# Patient Record
Sex: FEMALE | Race: BLACK OR AFRICAN AMERICAN | NOT HISPANIC OR LATINO | Employment: OTHER | URBAN - METROPOLITAN AREA
[De-identification: names, ages, dates, MRNs, and addresses within clinical notes are randomized per-mention and may not be internally consistent; named-entity substitution may affect disease eponyms.]

---

## 2018-10-02 NOTE — PATIENT DISCUSSION
Continue: ofloxacin (ofloxacin): drops: 0.3% 1 drop four times a day as directed into affected eye 09-

## 2018-10-02 NOTE — PATIENT DISCUSSION
Continue: prednisolone acetate (prednisolone acetate): drops,suspension: 1% 1 drop four times a day as directed into affected eye 09-

## 2022-05-17 ENCOUNTER — PREPPED CHART (OUTPATIENT)
Dept: URBAN - METROPOLITAN AREA CLINIC 7 | Facility: CLINIC | Age: 82
End: 2022-05-17

## 2022-07-07 RX ORDER — AMLODIPINE BESYLATE 2.5 MG/1
TABLET ORAL
COMMUNITY

## 2022-07-07 RX ORDER — ATORVASTATIN CALCIUM 10 MG/1
TABLET, FILM COATED ORAL
COMMUNITY

## 2022-07-07 RX ORDER — BISOPROLOL FUMARATE AND HYDROCHLOROTHIAZIDE 10; 6.25 MG/1; MG/1
TABLET ORAL
COMMUNITY
End: 2022-09-12

## 2022-07-07 RX ORDER — ALBUTEROL SULFATE 90 UG/1
AEROSOL, METERED RESPIRATORY (INHALATION)
COMMUNITY
End: 2022-10-31 | Stop reason: SDUPTHER

## 2022-07-07 RX ORDER — FLUTICASONE PROPIONATE 50 MCG
SPRAY, SUSPENSION (ML) NASAL
COMMUNITY

## 2022-11-01 ENCOUNTER — ESTABLISHED PATIENT (OUTPATIENT)
Facility: LOCATION | Age: 82
End: 2022-11-01

## 2022-11-01 DIAGNOSIS — H47.323: ICD-10-CM

## 2022-11-01 DIAGNOSIS — H25.89: ICD-10-CM

## 2022-11-01 DIAGNOSIS — E11.3293: ICD-10-CM

## 2022-11-01 DIAGNOSIS — H52.223: ICD-10-CM

## 2022-11-01 PROCEDURE — 92250 FUNDUS PHOTOGRAPHY W/I&R: CPT

## 2022-11-01 PROCEDURE — 92014 COMPRE OPH EXAM EST PT 1/>: CPT

## 2022-11-01 PROCEDURE — 92015 DETERMINE REFRACTIVE STATE: CPT

## 2022-11-01 ASSESSMENT — KERATOMETRY
OD_K2POWER_DIOPTERS: 44.75
OD_AXISANGLE2_DEGREES: 63
OS_AXISANGLE_DEGREES: 13
OS_K1POWER_DIOPTERS: 44.75
OS_AXISANGLE2_DEGREES: 103
OD_K1POWER_DIOPTERS: 44.00
OS_K2POWER_DIOPTERS: 45.25
OD_AXISANGLE_DEGREES: 153

## 2022-11-01 ASSESSMENT — VISUAL ACUITY
OS_SC: 20/25-2
OD_SC: 20/25
OU_SC: 20/25-2

## 2022-11-01 ASSESSMENT — TONOMETRY
OS_IOP_MMHG: 18
OD_IOP_MMHG: 18

## 2023-08-15 NOTE — PATIENT DISCUSSION
1 DAY POST-OP, OS- Ocuflox-1 drop 4 times a day until next visit. Acular-1 drop 4 times a day until next visit. Pred-forte-start using today, 1 drop 4 times a day until next visit. Patient was also given instruction sheet. Tetracycline Counseling: Patient counseled regarding possible photosensitivity and increased risk for sunburn.  Patient instructed to avoid sunlight, if possible.  When exposed to sunlight, patients should wear protective clothing, sunglasses, and sunscreen.  The patient was instructed to call the office immediately if the following severe adverse effects occur:  hearing changes, easy bruising/bleeding, severe headache, or vision changes.  The patient verbalized understanding of the proper use and possible adverse effects of tetracycline.  All of the patient's questions and concerns were addressed. Patient understands to avoid pregnancy while on therapy due to potential birth defects.

## 2023-11-06 ASSESSMENT — KERATOMETRY
OS_K2POWER_DIOPTERS: 45.25
OS_AXISANGLE_DEGREES: 13
OD_AXISANGLE2_DEGREES: 63
OD_K2POWER_DIOPTERS: 44.75
OD_AXISANGLE_DEGREES: 153
OD_K1POWER_DIOPTERS: 44.00
OS_K1POWER_DIOPTERS: 44.75
OS_AXISANGLE2_DEGREES: 103

## 2023-11-07 ENCOUNTER — ESTABLISHED PATIENT (OUTPATIENT)
Facility: LOCATION | Age: 83
End: 2023-11-07

## 2023-11-07 DIAGNOSIS — E11.3293: ICD-10-CM

## 2023-11-07 DIAGNOSIS — H52.223: ICD-10-CM

## 2023-11-07 DIAGNOSIS — H47.323: ICD-10-CM

## 2023-11-07 DIAGNOSIS — H25.89: ICD-10-CM

## 2023-11-07 PROCEDURE — 92014 COMPRE OPH EXAM EST PT 1/>: CPT

## 2023-11-07 PROCEDURE — 92250 FUNDUS PHOTOGRAPHY W/I&R: CPT

## 2023-11-07 PROCEDURE — 92015 DETERMINE REFRACTIVE STATE: CPT

## 2023-11-07 ASSESSMENT — VISUAL ACUITY
OU_SC: 20/25-1
OD_SC: 20/25
OS_SC: 20/30

## 2023-11-07 ASSESSMENT — TONOMETRY
OS_IOP_MMHG: 16
OD_IOP_MMHG: 18

## 2025-05-20 ENCOUNTER — COMPREHENSIVE EXAM (OUTPATIENT)
Age: 85
End: 2025-05-20

## 2025-05-20 DIAGNOSIS — H47.323: ICD-10-CM

## 2025-05-20 DIAGNOSIS — H52.223: ICD-10-CM

## 2025-05-20 PROCEDURE — 92015 DETERMINE REFRACTIVE STATE: CPT

## 2025-05-20 PROCEDURE — 92250 FUNDUS PHOTOGRAPHY W/I&R: CPT

## 2025-05-20 PROCEDURE — 92014 COMPRE OPH EXAM EST PT 1/>: CPT
